# Patient Record
Sex: MALE | Race: BLACK OR AFRICAN AMERICAN | URBAN - METROPOLITAN AREA
[De-identification: names, ages, dates, MRNs, and addresses within clinical notes are randomized per-mention and may not be internally consistent; named-entity substitution may affect disease eponyms.]

---

## 2018-02-01 ENCOUNTER — THERAPY VISIT (OUTPATIENT)
Dept: PHYSICAL THERAPY | Facility: CLINIC | Age: 38
End: 2018-02-01
Payer: COMMERCIAL

## 2018-02-01 DIAGNOSIS — S86.012D RUPTURE OF LEFT ACHILLES TENDON, SUBSEQUENT ENCOUNTER: Primary | ICD-10-CM

## 2018-02-01 PROCEDURE — 97140 MANUAL THERAPY 1/> REGIONS: CPT | Mod: GP | Performed by: PHYSICAL THERAPIST

## 2018-02-01 PROCEDURE — 97110 THERAPEUTIC EXERCISES: CPT | Mod: GP | Performed by: PHYSICAL THERAPIST

## 2018-02-01 PROCEDURE — 97010 HOT OR COLD PACKS THERAPY: CPT | Mod: GP | Performed by: PHYSICAL THERAPIST

## 2018-02-01 PROCEDURE — 97035 APP MDLTY 1+ULTRASOUND EA 15: CPT | Mod: GP | Performed by: PHYSICAL THERAPIST

## 2018-02-01 PROCEDURE — 97161 PT EVAL LOW COMPLEX 20 MIN: CPT | Mod: GP | Performed by: PHYSICAL THERAPIST

## 2018-02-01 NOTE — PROGRESS NOTES
[facility] Initial Evaluation  Subjective:  Patient is a 38 year old male presenting with rehab left ankle/foot hpi.                                              Current occupation is [employer] ([employer]).                                    Objective:  System    Physical Exam    General     ROS    Assessment/Plan:

## 2018-02-01 NOTE — PROGRESS NOTES
[facility] Initial Evaluation  Subjective:  Patient is a 38 year old male presenting with rehab left ankle/foot hpi.           Pt suffered a partial left achilles tendon tear on 1/13/18 while playing basketball.  No plans for surgery.  He was casted for 2 weeks and has now been wearing a cam boot and using crutches and is NWB.  He has been given to OK to TTWB using the boot and crutches.  .    Site of Pain: achilles.    Pain is described as aching and stabbing (tightness) and is intermittent and reported as 2/10.  Associated symptoms:  Loss of motion/stiffness, edema and loss of strength. Pain is the same all the time.  Symptoms are exacerbated by weight bearing and walking and relieved by rest.  Since onset symptoms are gradually improving.  Special tests:  MRI.      General health as reported by patient is good.                  Barriers include:  None as reported by patient.    Red flags:  None as reported by patient.                        Objective:  System    Ankle/Foot Evaluation  ROM:    AROM:    Dorsiflexion:  Left:   -8  Right:   -3  Plantarflexion:  Left:  35    Right:  62            Strength is not assessed.      PALPATION:   Left ankle tenderness present at:  achilles tendon    EDEMA: Edema ankle: Moderate swelling around the achilles tendon.                                                              General     ROS    Assessment/Plan:    Patient is a 38 year old male with left side ankle complaints.    Patient has the following significant findings with corresponding treatment plan.                Diagnosis 1:  Left achilles tendon tear  Pain -  hot/cold therapy, US, manual therapy, education and home program  Decreased ROM/flexibility - manual therapy, therapeutic exercise and home program  Decreased strength - therapeutic exercise, therapeutic activities and home program    Therapy Evaluation Codes:   1) History comprised of:   Personal factors that impact the plan of care:       None.    Comorbidity factors that impact the plan of care are:      None.     Medications impacting care: None.  2) Examination of Body Systems comprised of:   Body structures and functions that impact the plan of care:      Ankle.   Activity limitations that impact the plan of care are:      Jumping, Running, Sports, Squatting/kneeling, Stairs and Walking.  3) Clinical presentation characteristics are:   Stable/Uncomplicated.  4) Decision-Making    Low complexity using standardized patient assessment instrument and/or measureable assessment of functional outcome.  Cumulative Therapy Evaluation is: Low complexity.    Previous and current functional limitations:  (See Goal Flow Sheet for this information)    Short term and Long term goals: (See Goal Flow Sheet for this information)     Communication ability:  Patient appears to be able to clearly communicate and understand verbal and written communication and follow directions correctly.  Treatment Explanation - The following has been discussed with the patient:   RX ordered/plan of care  Anticipated outcomes  Possible risks and side effects  This patient would benefit from PT intervention to resume normal activities.   Rehab potential is good.    Frequency:  1 X week, once daily  Duration:  for 6-10 weeks  Discharge Plan:  Achieve all LTG.  Independent in home treatment program.  Reach maximal therapeutic benefit.    Please refer to the daily flowsheet for treatment today, total treatment time and time spent performing 1:1 timed codes.

## 2018-02-08 ENCOUNTER — THERAPY VISIT (OUTPATIENT)
Dept: PHYSICAL THERAPY | Facility: CLINIC | Age: 38
End: 2018-02-08
Payer: COMMERCIAL

## 2018-02-08 DIAGNOSIS — S86.012D RUPTURE OF LEFT ACHILLES TENDON, SUBSEQUENT ENCOUNTER: ICD-10-CM

## 2018-02-08 PROCEDURE — 97035 APP MDLTY 1+ULTRASOUND EA 15: CPT | Mod: GP | Performed by: PHYSICAL THERAPIST

## 2018-02-08 PROCEDURE — 97110 THERAPEUTIC EXERCISES: CPT | Mod: GP | Performed by: PHYSICAL THERAPIST

## 2018-02-08 PROCEDURE — 97140 MANUAL THERAPY 1/> REGIONS: CPT | Mod: GP | Performed by: PHYSICAL THERAPIST

## 2018-02-15 ENCOUNTER — THERAPY VISIT (OUTPATIENT)
Dept: PHYSICAL THERAPY | Facility: CLINIC | Age: 38
End: 2018-02-15
Payer: COMMERCIAL

## 2018-02-15 DIAGNOSIS — S86.012D RUPTURE OF LEFT ACHILLES TENDON, SUBSEQUENT ENCOUNTER: ICD-10-CM

## 2018-02-15 PROCEDURE — 97110 THERAPEUTIC EXERCISES: CPT | Mod: GP | Performed by: PHYSICAL THERAPIST

## 2018-02-15 PROCEDURE — 97035 APP MDLTY 1+ULTRASOUND EA 15: CPT | Mod: GP | Performed by: PHYSICAL THERAPIST

## 2018-02-15 PROCEDURE — 97140 MANUAL THERAPY 1/> REGIONS: CPT | Mod: GP | Performed by: PHYSICAL THERAPIST

## 2018-02-28 ENCOUNTER — THERAPY VISIT (OUTPATIENT)
Dept: PHYSICAL THERAPY | Facility: CLINIC | Age: 38
End: 2018-02-28
Payer: COMMERCIAL

## 2018-02-28 DIAGNOSIS — S86.012D RUPTURE OF LEFT ACHILLES TENDON, SUBSEQUENT ENCOUNTER: ICD-10-CM

## 2018-02-28 PROCEDURE — 97110 THERAPEUTIC EXERCISES: CPT | Mod: GP | Performed by: PHYSICAL THERAPIST

## 2018-02-28 PROCEDURE — 97140 MANUAL THERAPY 1/> REGIONS: CPT | Mod: GP | Performed by: PHYSICAL THERAPIST

## 2018-03-08 ENCOUNTER — THERAPY VISIT (OUTPATIENT)
Dept: PHYSICAL THERAPY | Facility: CLINIC | Age: 38
End: 2018-03-08
Payer: COMMERCIAL

## 2018-03-08 DIAGNOSIS — S86.012D RUPTURE OF LEFT ACHILLES TENDON, SUBSEQUENT ENCOUNTER: ICD-10-CM

## 2018-03-08 PROCEDURE — 97110 THERAPEUTIC EXERCISES: CPT | Mod: GP | Performed by: PHYSICAL THERAPIST

## 2018-03-08 NOTE — LETTER
[facility]  [address]  [facility] [address]  [phone]    2018    Re: Sergio Parr   :   1980  MRN:  [id number]   REFERRING PHYSICIAN:   Marcelo Leslie MD    [facility]  Date of Initial Evaluation:  2018  Visits:  Rxs Used: 5  Reason for Referral:  Rupture of left Achilles tendon, subsequent encounter    DISCHARGE REPORT  Progress reporting period is from 18 to 3/8/18.       SUBJECTIVE  Subjective changes noted by patient:  Sergio has not scheduled further PT following his last visit on 3/8/18.  His status at that time was as follows:  Subjective: No longer using crutches.  Continues to wear boot per protocol until 3/10/18.  Will begin weaning off of boot then.      Current pain level is NA  .     Previous pain level was  NA  .   Changes in function:  Unknown currently.  Adverse reaction to treatment or activity: None    OBJECTIVE  Changes noted in objective findings:  The objective findings below are from DOS 3/8/18.  Objective: No tenderness to palpation of the achilles.  Minimal swelling.  Tolerating exercises well.       ASSESSMENT/PLAN  Updated problem list and treatment plan: Diagnosis 1:  Left achilles tear    STG/LTGs have been met or progress has been made towards goals:  Unknown.  Assessment of Progress: The patient's condition is improving.  The patient has not returned to therapy. Current status is unknown.  Self Management Plans:  Patient has been instructed in a home treatment program.  Sergio continues to require the following intervention to meet STG and LTG's:  PT intervention is no longer required to meet STG/LTG.    Recommendations:  This patient is ready to be discharged from therapy and continue their home treatment program.                Re: Sergio Parr   :   1980                                    Thank you for your referral.    INQUIRIES  Therapist: Andry Ferraro, PT  [facility] [place]  [address]  [facility] [address]  Phone: [phone]  Fax: [phone]

## 2018-04-05 PROBLEM — S86.012D RUPTURE OF LEFT ACHILLES TENDON, SUBSEQUENT ENCOUNTER: Status: RESOLVED | Noted: 2018-02-01 | Resolved: 2018-04-05

## 2018-04-05 NOTE — PROGRESS NOTES
Subjective:  HPI                    Objective:  System    Physical Exam    General     ROS    Assessment/Plan:    DISCHARGE REPORT    Progress reporting period is from 2/1/18 to 3/8/18.       SUBJECTIVE  Subjective changes noted by patient:  Sergio has not scheduled further PT following his last visit on 3/8/18.  His status at that time was as follows:  Subjective: No longer using crutches.  Continues to wear boot per protocol until 3/10/18.  Will begin weaning off of boot then.      Current pain level is NA  .     Previous pain level was  NA  .   Changes in function:  Unknown currently.  Adverse reaction to treatment or activity: None    OBJECTIVE  Changes noted in objective findings:  The objective findings below are from DOS 3/8/18.  Objective: No tenderness to palpation of the achilles.  Minimal swelling.  Tolerating exercises well.       ASSESSMENT/PLAN  Updated problem list and treatment plan: Diagnosis 1:  Left achilles tear    STG/LTGs have been met or progress has been made towards goals:  Unknown.  Assessment of Progress: The patient's condition is improving.  The patient has not returned to therapy. Current status is unknown.  Self Management Plans:  Patient has been instructed in a home treatment program.    Sergio continues to require the following intervention to meet STG and LTG's:  PT intervention is no longer required to meet STG/LTG.    Recommendations:  This patient is ready to be discharged from therapy and continue their home treatment program.    Please refer to the daily flowsheet for treatment today, total treatment time and time spent performing 1:1 timed codes.